# Patient Record
Sex: MALE | Race: WHITE | Employment: UNEMPLOYED | URBAN - METROPOLITAN AREA
[De-identification: names, ages, dates, MRNs, and addresses within clinical notes are randomized per-mention and may not be internally consistent; named-entity substitution may affect disease eponyms.]

---
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
## 2021-08-17 ENCOUNTER — OFFICE VISIT (OUTPATIENT)
Dept: URGENT CARE | Facility: CLINIC | Age: 21
End: 2021-08-17
Payer: COMMERCIAL

## 2021-08-17 VITALS
SYSTOLIC BLOOD PRESSURE: 130 MMHG | TEMPERATURE: 98.2 F | OXYGEN SATURATION: 98 % | BODY MASS INDEX: 23.7 KG/M2 | WEIGHT: 175 LBS | RESPIRATION RATE: 16 BRPM | HEART RATE: 79 BPM | HEIGHT: 72 IN | DIASTOLIC BLOOD PRESSURE: 72 MMHG

## 2021-08-17 DIAGNOSIS — L73.9 FOLLICULITIS: Primary | ICD-10-CM

## 2021-08-17 PROCEDURE — 99202 OFFICE O/P NEW SF 15 MIN: CPT | Performed by: FAMILY MEDICINE

## 2021-08-17 NOTE — PATIENT INSTRUCTIONS
Appears to be rash related to shaving of pubic air  Avoid shaving hair  May do over-the-counter cortisone cream for comfort  If your choosing to be sexually active would recommend good protection  If you are choosing to be sexually active, especially with multiple partners, would recommend routine evaluation at an STD clinic

## 2021-08-17 NOTE — PROGRESS NOTES
[facility]    NAME: Ro Castellanos is a 24 y o  male  : 2000    MRN: [id number]  DATE: 2021  TIME: 12:30 PM    Assessment and Plan   Folliculitis [T19 5]  1  Folliculitis         Patient Instructions   Patient Instructions     Appears to be rash related to shaving of pubic air  Avoid shaving hair  May do over-the-counter cortisone cream for comfort  If your choosing to be sexually active would recommend good protection  If you are choosing to be sexually active, especially with multiple partners, would recommend routine evaluation at an STD clinic  Chief Complaint     Chief Complaint   Patient presents with    Exposure to STD     Red rash to pubic area x 1 week  Admits to unprotected sex over last couple weeks  Denies redness or drainage to penis or dysuria  History of Present Illness   Ro Castellanos presents to the clinic c/o    61-year-old male comes in with increased redness in pubic hair region with mild itching  Noticed it about a week ago  Has been shaving his pubic air  Denies any lesions on the shaft of his penis  Is sexually active without protection  Denies any UTI symptoms  Review of Systems   Review of Systems   Constitutional: Negative  Genitourinary: Negative for decreased urine volume, difficulty urinating, discharge, dysuria, frequency, genital sores, hematuria, penile pain, penile swelling, scrotal swelling, testicular pain and urgency  Skin: Positive for rash  Current Medications     No long-term medications on file  Current Allergies     Allergies as of 2021    (No Known Allergies)          The following portions of the patient's history were reviewed and updated as appropriate: allergies, current medications, past family history, past medical history, past social history, past surgical history and problem list   History reviewed  No pertinent past medical history    Past Surgical History:   Procedure Laterality Date    MOUTH SURGERY       No family history on file  Objective   /72   Pulse 79   Temp 98 2 °F (36 8 °C)   Resp 16   Ht 6' (1 829 m)   Wt 79 4 kg (175 lb)   SpO2 98%   BMI 23 73 kg/m²   No LMP for male patient  Physical Exam     Physical Exam  Vitals and nursing note reviewed  Exam conducted with a chaperone present (Nurse accompanies in exam room)  Constitutional:       General: He is not in acute distress  Appearance: Normal appearance  He is well-developed  He is not ill-appearing, toxic-appearing or diaphoretic  Cardiovascular:      Rate and Rhythm: Normal rate and regular rhythm  Pulmonary:      Effort: Pulmonary effort is normal    Genitourinary:     Comments: Normal external male  No obvious lesions on penis  No discharge from penis  Skin:     General: Skin is warm and dry  Findings: Erythema present  Comments: Increased redness above penis in suprapubic region with shortened pubic hair  Appears consistent for folliculitis secondary to shave  Small red prominent follicles, nontender to palpation  No pustules  Neurological:      Mental Status: He is alert and oriented to person, place, and time